# Patient Record
Sex: MALE | Race: WHITE | NOT HISPANIC OR LATINO | Employment: FULL TIME | URBAN - METROPOLITAN AREA
[De-identification: names, ages, dates, MRNs, and addresses within clinical notes are randomized per-mention and may not be internally consistent; named-entity substitution may affect disease eponyms.]

---

## 2024-02-28 ENCOUNTER — OFFICE VISIT (OUTPATIENT)
Dept: PRIMARY CARE CLINIC | Facility: CLINIC | Age: 39
End: 2024-02-28
Payer: COMMERCIAL

## 2024-02-28 VITALS
HEART RATE: 104 BPM | DIASTOLIC BLOOD PRESSURE: 96 MMHG | SYSTOLIC BLOOD PRESSURE: 146 MMHG | WEIGHT: 276 LBS | HEIGHT: 70 IN | OXYGEN SATURATION: 97 % | BODY MASS INDEX: 39.51 KG/M2

## 2024-02-28 DIAGNOSIS — Z11.4 SCREENING FOR HIV (HUMAN IMMUNODEFICIENCY VIRUS): ICD-10-CM

## 2024-02-28 DIAGNOSIS — Z13.1 SCREENING FOR DIABETES MELLITUS (DM): ICD-10-CM

## 2024-02-28 DIAGNOSIS — I10 HYPERTENSION, UNSPECIFIED TYPE: ICD-10-CM

## 2024-02-28 DIAGNOSIS — Z11.59 ENCOUNTER FOR HEPATITIS C SCREENING TEST FOR LOW RISK PATIENT: ICD-10-CM

## 2024-02-28 DIAGNOSIS — Z00.00 ANNUAL PHYSICAL EXAM: Primary | ICD-10-CM

## 2024-02-28 DIAGNOSIS — Z13.220 ENCOUNTER FOR LIPID SCREENING FOR CARDIOVASCULAR DISEASE: ICD-10-CM

## 2024-02-28 DIAGNOSIS — Z13.6 ENCOUNTER FOR LIPID SCREENING FOR CARDIOVASCULAR DISEASE: ICD-10-CM

## 2024-02-28 PROCEDURE — 3080F DIAST BP >= 90 MM HG: CPT | Mod: CPTII,S$GLB,, | Performed by: NURSE PRACTITIONER

## 2024-02-28 PROCEDURE — 99204 OFFICE O/P NEW MOD 45 MIN: CPT | Mod: S$GLB,,, | Performed by: NURSE PRACTITIONER

## 2024-02-28 PROCEDURE — 4010F ACE/ARB THERAPY RXD/TAKEN: CPT | Mod: CPTII,S$GLB,, | Performed by: NURSE PRACTITIONER

## 2024-02-28 PROCEDURE — 99999 PR PBB SHADOW E&M-NEW PATIENT-LVL IV: CPT | Mod: PBBFAC,,, | Performed by: NURSE PRACTITIONER

## 2024-02-28 PROCEDURE — 1159F MED LIST DOCD IN RCRD: CPT | Mod: CPTII,S$GLB,, | Performed by: NURSE PRACTITIONER

## 2024-02-28 PROCEDURE — 3077F SYST BP >= 140 MM HG: CPT | Mod: CPTII,S$GLB,, | Performed by: NURSE PRACTITIONER

## 2024-02-28 PROCEDURE — 3008F BODY MASS INDEX DOCD: CPT | Mod: CPTII,S$GLB,, | Performed by: NURSE PRACTITIONER

## 2024-02-28 RX ORDER — BLOOD SUGAR DIAGNOSTIC
STRIP MISCELLANEOUS EVERY MORNING
COMMUNITY
Start: 2023-10-25

## 2024-02-28 RX ORDER — LISINOPRIL 10 MG/1
10 TABLET ORAL DAILY
Qty: 90 TABLET | Refills: 3 | Status: SHIPPED | OUTPATIENT
Start: 2024-02-28 | End: 2025-02-27

## 2024-02-28 RX ORDER — PHENYLEPHRINE HCL 10 MG
500 TABLET ORAL DAILY
COMMUNITY

## 2024-02-28 RX ORDER — LANCETS 33 GAUGE
EACH MISCELLANEOUS EVERY MORNING
COMMUNITY
Start: 2023-10-25

## 2024-02-28 RX ORDER — LANCETS 30 GAUGE
EACH MISCELLANEOUS
COMMUNITY
Start: 2023-10-25

## 2024-02-28 NOTE — PROGRESS NOTES
Assessment/Plan:    Problem List Items Addressed This Visit          Cardiac/Vascular    Hypertension    Overview     BP uncontrolled.  Elevated in clinic today.  Plan to start lisinopril 10mg daily.  Pt to check BP at home over next 2 wks and send readings to me for evaluation.           Relevant Medications    lisinopriL 10 MG tablet     Other Visit Diagnoses       Annual physical exam    -  Primary    Relevant Orders    COMPREHENSIVE METABOLIC PANEL    CBC Without Differential    TSH    Encounter for lipid screening for cardiovascular disease        Relevant Orders    LIPID PANEL    Screening for HIV (human immunodeficiency virus)        Relevant Orders    HIV 1/2 Ag/Ab (4th Gen)    Encounter for hepatitis C screening test for low risk patient        Relevant Orders    Hepatitis C Antibody    Screening for diabetes mellitus (DM)        Relevant Orders    HEMOGLOBIN A1C        Pt to check BP at home over next 2 wks and send readings to me for evaluation.     Follow up in about 3 months (around 5/28/2024) for BP.    Beverly Cordero NP  ______________________________________________________________________________________________________________________________    CC: Establish care    HPI:    Patient is a new patient to me here to establish care.  38-year-old male with medical history of prediabetes presenting today to establish care.    Previous PCP: Truman Harris NP    HTN: The patient does not have a diagnosis of hypertension, however the blood pressure is high in clinic today.  The patient states that he has had high BP readings at his last physical.  The patient denies headache, vision changes, chest pain, shortness of breath, dizziness, palpitations.  Denies any identifiable exacerbating or relieving factors.     He also states that he was diagnosed with pre-diabetes in the past.  The patient reports that he has lost 30lbs over the last 3 months and is exercising.  He checks his blood sugar every other  "day and it ranges  pre & post meals.    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    No other new complaints today.  Remaining chronic conditions have been reviewed and remain stable. Further detail as stated above.      HM reviewed.    Past Medical History:  Past Medical History:   Diagnosis Date    Pre-diabetes      Past Surgical History:   Procedure Laterality Date    VASECTOMY       Review of patient's allergies indicates:  No Known Allergies  Social History     Tobacco Use    Smoking status: Every Day     Types: Vaping with nicotine    Smokeless tobacco: Never   Substance Use Topics    Alcohol use: Yes     Comment: only on vacation     Family History   Problem Relation Age of Onset    Hypertension Mother     Diabetes Father     Pancreatic cancer Father     Diabetes Sister     Breast cancer Sister     Hypertension Sister      Current Outpatient Medications on File Prior to Visit   Medication Sig Dispense Refill    ONETOUCH DELICA PLUS LANCET 33 gauge Misc Apply topically every morning.      ONETOUCH ULTRA TEST Strp every morning.      ONETOUCH ULTRA2 METER Misc SMARTSIG:Via Meter Daily      cinnamon bark (CINNAMON) 500 mg capsule Take 500 mg by mouth once daily.       No current facility-administered medications on file prior to visit.       Review of Systems   Constitutional: Negative.    HENT: Negative.     Eyes: Negative.    Respiratory: Negative.     Cardiovascular: Negative.    Gastrointestinal: Negative.    Endocrine: Negative.    Genitourinary: Negative.    Musculoskeletal: Negative.    Skin: Negative.    Allergic/Immunologic: Negative.    Neurological: Negative.    Hematological: Negative.    Psychiatric/Behavioral: Negative.         Vitals:    02/28/24 1049 02/28/24 1105   BP: (!) 158/102 (!) 146/96   Pulse: 104    SpO2: 97%    Weight: 125.2 kg (276 lb 0.3 oz)    Height: 5' 10" (1.778 m)        Wt Readings from Last 3 Encounters:   02/28/24 125.2 kg (276 lb 0.3 oz) "       Physical Exam  Vitals and nursing note reviewed.   Constitutional:       Appearance: Normal appearance. He is well-developed. He is obese.   HENT:      Head: Normocephalic and atraumatic.   Eyes:      Pupils: Pupils are equal, round, and reactive to light.   Cardiovascular:      Rate and Rhythm: Normal rate and regular rhythm.      Pulses: Normal pulses.      Heart sounds: Normal heart sounds.   Pulmonary:      Effort: Pulmonary effort is normal. No tachypnea, accessory muscle usage or respiratory distress.      Breath sounds: Normal breath sounds.   Abdominal:      General: Bowel sounds are normal.      Palpations: Abdomen is soft.      Tenderness: There is no abdominal tenderness.   Musculoskeletal:         General: Normal range of motion.      Cervical back: Normal range of motion and neck supple.   Skin:     General: Skin is warm and dry.      Capillary Refill: Capillary refill takes less than 2 seconds.   Neurological:      Mental Status: He is alert and oriented to person, place, and time.      Deep Tendon Reflexes: Reflexes are normal and symmetric.   Psychiatric:         Speech: Speech normal.         Behavior: Behavior normal.         Thought Content: Thought content normal.         Judgment: Judgment normal.         Health Maintenance   Topic Date Due    Hepatitis C Screening  Never done    Lipid Panel  Never done    TETANUS VACCINE  03/10/2033

## 2024-03-01 LAB
ALBUMIN SERPL-MCNC: 4.5 G/DL (ref 3.6–5.1)
ALBUMIN/GLOB SERPL: 1.4 (CALC) (ref 1–2.5)
ALP SERPL-CCNC: 76 U/L (ref 36–130)
ALT SERPL-CCNC: 35 U/L (ref 9–46)
AST SERPL-CCNC: 20 U/L (ref 10–40)
BASOPHILS # BLD AUTO: 56 CELLS/UL (ref 0–200)
BASOPHILS NFR BLD AUTO: 0.6 %
BILIRUB SERPL-MCNC: 0.5 MG/DL (ref 0.2–1.2)
BUN SERPL-MCNC: 19 MG/DL (ref 7–25)
BUN/CREAT SERPL: ABNORMAL (CALC) (ref 6–22)
CALCIUM SERPL-MCNC: 9.7 MG/DL (ref 8.6–10.3)
CHLORIDE SERPL-SCNC: 102 MMOL/L (ref 98–110)
CHOLEST SERPL-MCNC: 212 MG/DL
CHOLEST/HDLC SERPL: 7.1 (CALC)
CO2 SERPL-SCNC: 28 MMOL/L (ref 20–32)
CREAT SERPL-MCNC: 1.03 MG/DL (ref 0.6–1.26)
EGFR: 97 ML/MIN/1.73M2
EOSINOPHIL # BLD AUTO: 214 CELLS/UL (ref 15–500)
EOSINOPHIL NFR BLD AUTO: 2.3 %
ERYTHROCYTE [DISTWIDTH] IN BLOOD BY AUTOMATED COUNT: 12.9 % (ref 11–15)
GLOBULIN SER CALC-MCNC: 3.3 G/DL (CALC) (ref 1.9–3.7)
GLUCOSE SERPL-MCNC: 108 MG/DL (ref 65–99)
HBA1C MFR BLD: 5.5 % OF TOTAL HGB
HCT VFR BLD AUTO: 47.5 % (ref 38.5–50)
HCV AB SERPL QL IA: NORMAL
HDLC SERPL-MCNC: 30 MG/DL
HGB BLD-MCNC: 15.2 G/DL (ref 13.2–17.1)
HIV 1+2 AB+HIV1 P24 AG SERPL QL IA: NORMAL
LDLC SERPL CALC-MCNC: 155 MG/DL (CALC)
LYMPHOCYTES # BLD AUTO: 2753 CELLS/UL (ref 850–3900)
LYMPHOCYTES NFR BLD AUTO: 29.6 %
MCH RBC QN AUTO: 28.1 PG (ref 27–33)
MCHC RBC AUTO-ENTMCNC: 32 G/DL (ref 32–36)
MCV RBC AUTO: 88 FL (ref 80–100)
MONOCYTES # BLD AUTO: 698 CELLS/UL (ref 200–950)
MONOCYTES NFR BLD AUTO: 7.5 %
NEUTROPHILS # BLD AUTO: 5580 CELLS/UL (ref 1500–7800)
NEUTROPHILS NFR BLD AUTO: 60 %
NONHDLC SERPL-MCNC: 182 MG/DL (CALC)
PLATELET # BLD AUTO: 221 THOUSAND/UL (ref 140–400)
PMV BLD REES-ECKER: 10.8 FL (ref 7.5–12.5)
POTASSIUM SERPL-SCNC: 4.8 MMOL/L (ref 3.5–5.3)
PROT SERPL-MCNC: 7.8 G/DL (ref 6.1–8.1)
RBC # BLD AUTO: 5.4 MILLION/UL (ref 4.2–5.8)
SODIUM SERPL-SCNC: 139 MMOL/L (ref 135–146)
TRIGL SERPL-MCNC: 145 MG/DL
TSH SERPL-ACNC: 1.65 MIU/L (ref 0.4–4.5)
WBC # BLD AUTO: 9.3 THOUSAND/UL (ref 3.8–10.8)

## 2024-03-04 ENCOUNTER — TELEPHONE (OUTPATIENT)
Dept: PRIMARY CARE CLINIC | Facility: CLINIC | Age: 39
End: 2024-03-04
Payer: COMMERCIAL

## 2024-03-04 DIAGNOSIS — E78.89 LIPIDS ABNORMAL: Primary | ICD-10-CM

## 2024-03-04 NOTE — PROGRESS NOTES
Results have been released via my chart. Please verify that these have been reviewed by the pt. If not, please call pt with results.     Please schedule orders:  Lipid panel in 6 months

## 2024-03-04 NOTE — TELEPHONE ENCOUNTER
----- Message from Beverly Cordero NP sent at 3/4/2024 12:31 PM CST -----  Results have been released via my chart. Please verify that these have been reviewed by the pt. If not, please call pt with results.     Please schedule orders:  Lipid panel in 6 months

## 2024-03-04 NOTE — TELEPHONE ENCOUNTER
I have signed for the following orders AND/OR meds.  Please call the patient and ask the patient to schedule the testing AND/OR inform about any medications that were sent. Medications have been sent to pharmacy listed below      Orders Placed This Encounter   Procedures    LIPID PANEL     Standing Status:   Future     Standing Expiration Date:   6/2/2025              [id number] - [place] [address] AT [facility] [address] [address]  Phone: [phone] Fax: [phone]

## 2024-03-12 ENCOUNTER — OFFICE VISIT (OUTPATIENT)
Dept: PRIMARY CARE CLINIC | Facility: CLINIC | Age: 39
End: 2024-03-12
Payer: COMMERCIAL

## 2024-03-12 VITALS — SYSTOLIC BLOOD PRESSURE: 116 MMHG | DIASTOLIC BLOOD PRESSURE: 82 MMHG

## 2024-03-12 DIAGNOSIS — I10 HYPERTENSION, UNSPECIFIED TYPE: Primary | ICD-10-CM

## 2024-03-12 PROCEDURE — 3074F SYST BP LT 130 MM HG: CPT | Mod: CPTII,95,, | Performed by: NURSE PRACTITIONER

## 2024-03-12 PROCEDURE — 4010F ACE/ARB THERAPY RXD/TAKEN: CPT | Mod: CPTII,95,, | Performed by: NURSE PRACTITIONER

## 2024-03-12 PROCEDURE — 3079F DIAST BP 80-89 MM HG: CPT | Mod: CPTII,95,, | Performed by: NURSE PRACTITIONER

## 2024-03-12 PROCEDURE — 99213 OFFICE O/P EST LOW 20 MIN: CPT | Mod: 95,,, | Performed by: NURSE PRACTITIONER

## 2024-03-12 PROCEDURE — 3044F HG A1C LEVEL LT 7.0%: CPT | Mod: CPTII,95,, | Performed by: NURSE PRACTITIONER

## 2024-03-13 NOTE — PROGRESS NOTES
Primary Care Telemedicine Note  The patient location is:  Patient Home   The chief complaint leading to consultation is: bp follow-up   Total time spent with patient: 10 minutes     Visit type: Virtual visit with synchronous audio and video  Each patient to whom he or she provides medical services by telemedicine is:  (1) informed of the relationship between the physician and patient and the respective role of any other health care provider with respect to management of the patient; and (2) notified that he or she may decline to receive medical services by telemedicine and may withdraw from such care at any time.      Assessment/Plan:    Problem List Items Addressed This Visit          Cardiac/Vascular    Hypertension - Primary    Overview     BP uncontrolled.  Elevated in clinic today.  Plan to start lisinopril 10mg daily.  Pt to check BP at home over next 2 wks and send readings to me for evaluation.           Current Assessment & Plan     Hypertension Medications               lisinopriL 10 MG tablet Take 1 tablet (10 mg total) by mouth once daily.        -at goal today  -continue lifestyle modification with low sodium diet and exercise   -discussed hypertension disease course and importance of treating high blood pressure  -patient understood and advised of risk of untreated blood pressure.  ER precautions were given   for symptoms of hypertensive urgency and emergency.             Follow up in about 3 months (around 6/12/2024).    Beverly Cordero NP    _____________________________________________________________________________________________________________________________________________________    CC:  Blood pressure follow-up    HPI:    Patient is an established patient who presents today via virtual visit for blood pressure follow-up.    HTN: The patient is currently being treated for essential hypertension. This condition is chronic and stable. The patient is tolerating their medication well with good  compliance.  Denies any adverse effects of medications.  Counseling was offered regarding low sodium diet.  The patient has a reduced salt intake. Routine exercise recommended. The patient denies headache, vision changes, chest pain, palpitations, shortness of breath, or lower extremity edema.     Past Medical History:  Past Medical History:   Diagnosis Date    Pre-diabetes      Past Surgical History:   Procedure Laterality Date    VASECTOMY       Review of patient's allergies indicates:  No Known Allergies  Social History     Tobacco Use    Smoking status: Every Day     Types: Vaping with nicotine    Smokeless tobacco: Never   Substance Use Topics    Alcohol use: Yes     Comment: only on vacation     Family History   Problem Relation Age of Onset    Hypertension Mother     Diabetes Father     Pancreatic cancer Father     Diabetes Sister     Breast cancer Sister     Hypertension Sister      Current Outpatient Medications on File Prior to Visit   Medication Sig Dispense Refill    cinnamon bark (CINNAMON) 500 mg capsule Take 500 mg by mouth once daily.      lisinopriL 10 MG tablet Take 1 tablet (10 mg total) by mouth once daily. 90 tablet 3    ONETOUCH DELICA PLUS LANCET 33 gauge Misc Apply topically every morning.      ONETOUCH ULTRA TEST Strp every morning.      ONETOUCH ULTRA2 METER Misc SMARTSIG:Via Meter Daily       No current facility-administered medications on file prior to visit.       Review of Systems   Constitutional:  Negative for malaise/fatigue.   Eyes:  Negative for blurred vision.   Respiratory:  Negative for shortness of breath.    Cardiovascular:  Negative for chest pain, palpitations, orthopnea and PND.   Musculoskeletal:  Negative for neck pain.   Neurological:  Negative for headaches.         Physical Exam   @thptenteredbppulse@  @thptenteredglucose@    Physical Exam  Vitals reviewed.   Constitutional:       Appearance: Normal appearance. He is well-developed.   HENT:      Head: Normocephalic.    Eyes:      Conjunctiva/sclera: Conjunctivae normal.   Pulmonary:      Effort: Pulmonary effort is normal. No tachypnea, accessory muscle usage or respiratory distress.   Musculoskeletal:      Cervical back: Normal range of motion.   Neurological:      Mental Status: He is alert and oriented to person, place, and time.   Psychiatric:         Speech: Speech normal.         Behavior: Behavior normal.         Thought Content: Thought content normal.         Judgment: Judgment normal.         The patient's Health Maintenance was reviewed and the following appears to be due at this time:  Health Maintenance Due   Topic Date Due    Pneumococcal Vaccines (Age 0-64) (1 of 2 - PCV) Never done

## 2024-03-13 NOTE — ASSESSMENT & PLAN NOTE
Hypertension Medications               lisinopriL 10 MG tablet Take 1 tablet (10 mg total) by mouth once daily.          -at goal today  -continue lifestyle modification with low sodium diet and exercise   -discussed hypertension disease course and importance of treating high blood pressure  -patient understood and advised of risk of untreated blood pressure.  ER precautions were given   for symptoms of hypertensive urgency and emergency.

## 2024-10-04 DIAGNOSIS — I10 HYPERTENSION, UNSPECIFIED TYPE: ICD-10-CM

## 2024-10-07 RX ORDER — LISINOPRIL 10 MG/1
10 TABLET ORAL DAILY
Qty: 90 TABLET | Refills: 1 | Status: SHIPPED | OUTPATIENT
Start: 2024-10-07 | End: 2025-10-07

## 2024-10-07 NOTE — TELEPHONE ENCOUNTER
Refill Routing Note   Medication(s) are not appropriate for processing by [facility] for the following reason(s):        Non-participating provider  Responsible provider unclear    [facility] action(s):  Route             Appointments  past 12m or future 3m with PCP    Date Provider   Last Visit   3/12/2024 Beverly Cordero NP   Next Visit   Visit date not found Beverly Cordero NP   ED visits in past 90 days: 0        Note composed:8:48 AM 10/07/2024

## 2024-10-24 ENCOUNTER — PATIENT MESSAGE (OUTPATIENT)
Dept: RESEARCH | Facility: HOSPITAL | Age: 39
End: 2024-10-24
Payer: COMMERCIAL

## 2025-07-29 ENCOUNTER — PATIENT MESSAGE (OUTPATIENT)
Dept: ADMINISTRATIVE | Facility: HOSPITAL | Age: 40
End: 2025-07-29
Payer: COMMERCIAL